# Patient Record
Sex: MALE | Race: WHITE | Employment: OTHER | URBAN - METROPOLITAN AREA
[De-identification: names, ages, dates, MRNs, and addresses within clinical notes are randomized per-mention and may not be internally consistent; named-entity substitution may affect disease eponyms.]

---

## 2017-01-03 ENCOUNTER — LAB ENCOUNTER (OUTPATIENT)
Dept: LAB | Facility: HOSPITAL | Age: 37
End: 2017-01-03
Attending: SPECIALIST
Payer: MEDICAID

## 2017-01-03 ENCOUNTER — HOSPITAL ENCOUNTER (OUTPATIENT)
Dept: ULTRASOUND IMAGING | Facility: HOSPITAL | Age: 37
Discharge: HOME | End: 2017-01-03
Attending: SPECIALIST
Payer: MEDICAID

## 2017-01-03 ENCOUNTER — HOSPITAL ENCOUNTER (OUTPATIENT)
Dept: CT IMAGING | Facility: HOSPITAL | Age: 37
Discharge: HOME | End: 2017-01-03
Attending: SPECIALIST
Payer: MEDICAID

## 2017-01-03 ENCOUNTER — HOSPITAL ENCOUNTER (OUTPATIENT)
Dept: GENERAL RADIOLOGY | Facility: HOSPITAL | Age: 37
Discharge: HOME | End: 2017-01-03
Attending: RADIOLOGY
Payer: MEDICAID

## 2017-01-03 VITALS
HEIGHT: 73 IN | WEIGHT: 315 LBS | HEART RATE: 81 BPM | BODY MASS INDEX: 41.75 KG/M2 | OXYGEN SATURATION: 97 % | RESPIRATION RATE: 18 BRPM | TEMPERATURE: 98 F | SYSTOLIC BLOOD PRESSURE: 132 MMHG | DIASTOLIC BLOOD PRESSURE: 88 MMHG

## 2017-01-03 DIAGNOSIS — C78.02 SECONDARY SARCOMA OF LEFT LUNG (HCC): ICD-10-CM

## 2017-01-03 DIAGNOSIS — C78.01 SECONDARY SARCOMA OF RIGHT LUNG (HCC): ICD-10-CM

## 2017-01-03 DIAGNOSIS — J90 PLEURAL EFFUSION, RIGHT: ICD-10-CM

## 2017-01-03 DIAGNOSIS — C49.21 SOFT TISSUE SARCOMA OF RIGHT THIGH (HCC): ICD-10-CM

## 2017-01-03 DIAGNOSIS — C49.21 SOFT TISSUE SARCOMA OF RIGHT THIGH (HCC): Primary | ICD-10-CM

## 2017-01-03 LAB
ERYTHROCYTE [DISTWIDTH] IN BLOOD BY AUTOMATED COUNT: 13.7 % (ref 11.5–16)
GLUCOSE BLD-MCNC: 101 MG/DL (ref 65–99)
GLUCOSE BLD-MCNC: 112 MG/DL (ref 65–99)
GLUCOSE PLEURAL FLUID: 119 MG/DL
HCT VFR BLD AUTO: 43.4 % (ref 37–53)
HGB BLD-MCNC: 14.3 G/DL (ref 13–17)
INR BLD: 0.91 (ref 0.89–1.12)
LDH PLEURAL FLUID: 289 U/L
MCH RBC QN AUTO: 28.9 PG (ref 27–33.2)
MCHC RBC AUTO-ENTMCNC: 32.9 G/DL (ref 31–37)
MCV RBC AUTO: 87.9 FL (ref 80–99)
PLATELET # BLD AUTO: 213 10(3)UL (ref 150–450)
PLEURAL FLUID PH: 7.46 (ref 7.2–7.5)
PSA SERPL DL<=0.01 NG/ML-MCNC: 12.5 SECONDS (ref 12.3–14.8)
RBC # BLD AUTO: 4.94 X10(6)UL (ref 4.3–5.7)
RED CELL DISTRIBUTION WIDTH-SD: 44.4 FL (ref 35.1–46.3)
TOTAL PROTEIN PLEURAL FLUID: 4.4 G/DL
WBC # BLD AUTO: 8.3 X10(3) UL (ref 4–13)

## 2017-01-03 PROCEDURE — 85027 COMPLETE CBC AUTOMATED: CPT | Performed by: RADIOLOGY

## 2017-01-03 PROCEDURE — 83615 LACTATE (LD) (LDH) ENZYME: CPT | Performed by: SPECIALIST

## 2017-01-03 PROCEDURE — 71250 CT THORAX DX C-: CPT

## 2017-01-03 PROCEDURE — 88108 CYTOPATH CONCENTRATE TECH: CPT | Performed by: SPECIALIST

## 2017-01-03 PROCEDURE — 85610 PROTHROMBIN TIME: CPT | Performed by: RADIOLOGY

## 2017-01-03 PROCEDURE — 82945 GLUCOSE OTHER FLUID: CPT | Performed by: SPECIALIST

## 2017-01-03 PROCEDURE — 32555 ASPIRATE PLEURA W/ IMAGING: CPT | Performed by: SPECIALIST

## 2017-01-03 PROCEDURE — 87205 SMEAR GRAM STAIN: CPT | Performed by: SPECIALIST

## 2017-01-03 PROCEDURE — 87070 CULTURE OTHR SPECIMN AEROBIC: CPT | Performed by: SPECIALIST

## 2017-01-03 PROCEDURE — 82962 GLUCOSE BLOOD TEST: CPT

## 2017-01-03 PROCEDURE — 84157 ASSAY OF PROTEIN OTHER: CPT | Performed by: SPECIALIST

## 2017-01-03 PROCEDURE — 82800 BLOOD PH: CPT | Performed by: SPECIALIST

## 2017-01-03 PROCEDURE — 88305 TISSUE EXAM BY PATHOLOGIST: CPT | Performed by: SPECIALIST

## 2017-01-03 PROCEDURE — 71010 XR CHEST AP PORTABLE  (CPT=71010): CPT

## 2017-01-03 NOTE — IMAGING NOTE
US guided thoracentesis done, pt prepped and procedure done, pt tolerated well. 300 ml of fluid obtained.   Pt report to same day, pt transported to room 58

## 2017-01-03 NOTE — PROCEDURES
[facility]  Procedure Note    Shruthi Shrestha II Patient Status:  Outpatient    1980 MRN [id number]   Location [address] [place] [address] Attending Chrissy Milligan MD   Hosp Day # 0 PCP None Pcp     Procedure: Right thoracentesis    Pre-

## 2017-01-05 LAB — NON GYNE INTERPRETATION: NEGATIVE

## 2017-01-06 ENCOUNTER — TELEPHONE (OUTPATIENT)
Dept: HEMATOLOGY/ONCOLOGY | Facility: HOSPITAL | Age: 37
End: 2017-01-06

## 2017-01-06 NOTE — TELEPHONE ENCOUNTER
MD Any Dennis, RN                     Let patient know that fluid from pleural effusion is benign in nature and reflects a post-operative accumulation. Good result. I'll see him at his scheduled appt in March.

## 2017-01-06 NOTE — TELEPHONE ENCOUNTER
Return call from patient - notified of test results per Denzel Sirchristopher. Patient stated understanding. Will follow up in March as directed.

## 2017-01-07 PROBLEM — J90 PLEURAL EFFUSION, RIGHT: Status: ACTIVE | Noted: 2017-01-07

## 2017-03-13 ENCOUNTER — OFFICE VISIT (OUTPATIENT)
Dept: HEMATOLOGY/ONCOLOGY | Facility: HOSPITAL | Age: 37
End: 2017-03-13
Attending: SPECIALIST
Payer: MEDICAID

## 2017-03-13 ENCOUNTER — HOSPITAL ENCOUNTER (OUTPATIENT)
Dept: CT IMAGING | Age: 37
Discharge: HOME | End: 2017-03-13
Attending: SPECIALIST
Payer: MEDICAID

## 2017-03-13 VITALS
HEART RATE: 102 BPM | WEIGHT: 315 LBS | SYSTOLIC BLOOD PRESSURE: 150 MMHG | BODY MASS INDEX: 41.75 KG/M2 | DIASTOLIC BLOOD PRESSURE: 93 MMHG | OXYGEN SATURATION: 94 % | RESPIRATION RATE: 20 BRPM | TEMPERATURE: 99 F | HEIGHT: 73 IN

## 2017-03-13 DIAGNOSIS — C49.9 SOFT TISSUE SARCOMA (HCC): ICD-10-CM

## 2017-03-13 DIAGNOSIS — C78.01 SECONDARY SARCOMA OF RIGHT LUNG (HCC): ICD-10-CM

## 2017-03-13 DIAGNOSIS — C78.02 SECONDARY SARCOMA OF LEFT LUNG (HCC): ICD-10-CM

## 2017-03-13 DIAGNOSIS — C49.21 SOFT TISSUE SARCOMA OF RIGHT THIGH (HCC): Primary | ICD-10-CM

## 2017-03-13 PROCEDURE — 71250 CT THORAX DX C-: CPT

## 2017-03-13 PROCEDURE — 99213 OFFICE O/P EST LOW 20 MIN: CPT | Performed by: SPECIALIST

## 2017-03-13 RX ORDER — GABAPENTIN 300 MG/1
300 CAPSULE ORAL 3 TIMES DAILY
COMMUNITY
End: 2018-01-19

## 2017-03-23 NOTE — PROGRESS NOTES
[facility] Progress Note      Patient Name: Sebastián eJnkins   YOB: 1980  Medical Record Number: [id number]  Attending Physician: Ra Farnsworth. Blane Maria M.D.      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 Pleural effusion was felt to be a post-operative effusion. History of Present Illness  Patient returns for scheduled follow up. He denies any new or progressive cough or shortness of breath.  Denies any fe BMI 47.93 kg/m2  SpO2 94%    Physical Examination   Constitutional      Well developed, well nourished. Appears close to chronological age. No apparent distress. Head                   Normocephalic and atraumatic.   Eyes                  Conjunctiva alisha AORTA:  Normal.MEDIASTINUM/JAIRO:  Scattered non-enlarged middle mediastinal lymph nodes are redemonstrated. No new or enlarging nodes are identified. CARDIAC:  Normal.PLEURA:  Previously noted 3.9 cm thick dependent right pleural effusion has markedly impro pleomorphic soft tissue sarcoma: There is no definitive evidence of metastatic disease on CT scan. In the absence of measurable disease, no systemic therapy is recommended.  Patient remains at high risk of recurrent metastasis and continued active surveilla

## 2017-06-12 ENCOUNTER — APPOINTMENT (OUTPATIENT)
Dept: HEMATOLOGY/ONCOLOGY | Facility: HOSPITAL | Age: 37
End: 2017-06-12
Attending: SPECIALIST
Payer: MEDICAID

## 2017-06-16 ENCOUNTER — OFFICE VISIT (OUTPATIENT)
Dept: HEMATOLOGY/ONCOLOGY | Facility: HOSPITAL | Age: 37
End: 2017-06-16
Attending: SPECIALIST
Payer: MEDICAID

## 2017-06-16 ENCOUNTER — HOSPITAL ENCOUNTER (OUTPATIENT)
Dept: CT IMAGING | Age: 37
Discharge: HOME | End: 2017-06-16
Attending: SPECIALIST
Payer: MEDICAID

## 2017-06-16 VITALS
TEMPERATURE: 98 F | HEART RATE: 109 BPM | OXYGEN SATURATION: 97 % | BODY MASS INDEX: 41.75 KG/M2 | HEIGHT: 72.99 IN | DIASTOLIC BLOOD PRESSURE: 87 MMHG | RESPIRATION RATE: 20 BRPM | WEIGHT: 315 LBS | SYSTOLIC BLOOD PRESSURE: 145 MMHG

## 2017-06-16 DIAGNOSIS — C78.01 SECONDARY SARCOMA OF RIGHT LUNG (HCC): ICD-10-CM

## 2017-06-16 DIAGNOSIS — C49.21 SOFT TISSUE SARCOMA OF RIGHT THIGH (HCC): ICD-10-CM

## 2017-06-16 DIAGNOSIS — C78.02 SECONDARY SARCOMA OF LEFT LUNG (HCC): ICD-10-CM

## 2017-06-16 DIAGNOSIS — C49.21 SOFT TISSUE SARCOMA OF RIGHT THIGH (HCC): Primary | ICD-10-CM

## 2017-06-16 PROCEDURE — 71250 CT THORAX DX C-: CPT | Performed by: SPECIALIST

## 2017-06-16 PROCEDURE — 99213 OFFICE O/P EST LOW 20 MIN: CPT | Performed by: SPECIALIST

## 2017-06-16 NOTE — PROGRESS NOTES
Patient is here today for follow up with Janice Flannery for Sarcoma. Patient stated pain low back and right leg is rated a 4 on a scale of 0-10. Right rib area surgical site pain is improved neuropathy improved. Going to Physical therapy for back and leg pain.

## 2017-06-16 NOTE — PROGRESS NOTES
[facility] Progress Note      Patient Name: Linda Yusuf   YOB: 1980  Medical Record Number: [id number]  Attending Physician: Sara Kang. Chari Leach M.D.      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 Pleural effusion was felt to be a post-operative effusion. History of Present Illness  Patient returns for scheduled follow up. He denies any new or progressive cough or shortness of breath.  Denies any fe 1.854 m (6' 0.99\")   Wt (!) 158.3 kg (349 lb)   SpO2 97%   BMI 46.06 kg/m²     Physical Examination   Constitutional      Well developed, well nourished. Appears close to chronological age. No apparent distress.    Head                   Normocephalic and middle lobes. #2. No evidence of recurrent or metastatic disease.                        I independently visualized the radiologic images in addition to reviewing the written report: Post surgical changes; no pleural effusion; no new parenchymal lesions sugg

## 2017-09-22 ENCOUNTER — OFFICE VISIT (OUTPATIENT)
Dept: HEMATOLOGY/ONCOLOGY | Facility: HOSPITAL | Age: 37
End: 2017-09-22
Attending: SPECIALIST
Payer: MEDICAID

## 2017-09-22 ENCOUNTER — HOSPITAL ENCOUNTER (OUTPATIENT)
Dept: CT IMAGING | Age: 37
Discharge: HOME | End: 2017-09-22
Attending: SPECIALIST
Payer: MEDICAID

## 2017-09-22 VITALS
WEIGHT: 315 LBS | RESPIRATION RATE: 20 BRPM | OXYGEN SATURATION: 97 % | TEMPERATURE: 98 F | DIASTOLIC BLOOD PRESSURE: 89 MMHG | HEIGHT: 72.99 IN | SYSTOLIC BLOOD PRESSURE: 140 MMHG | BODY MASS INDEX: 41.75 KG/M2 | HEART RATE: 111 BPM

## 2017-09-22 DIAGNOSIS — C78.02 SECONDARY SARCOMA OF LEFT LUNG (HCC): ICD-10-CM

## 2017-09-22 DIAGNOSIS — C78.01 SECONDARY SARCOMA OF RIGHT LUNG (HCC): ICD-10-CM

## 2017-09-22 DIAGNOSIS — C49.21 SOFT TISSUE SARCOMA OF RIGHT THIGH (HCC): ICD-10-CM

## 2017-09-22 DIAGNOSIS — C49.21 SOFT TISSUE SARCOMA OF RIGHT THIGH (HCC): Primary | ICD-10-CM

## 2017-09-22 PROCEDURE — 99213 OFFICE O/P EST LOW 20 MIN: CPT | Performed by: SPECIALIST

## 2017-09-22 PROCEDURE — 71250 CT THORAX DX C-: CPT | Performed by: SPECIALIST

## 2017-09-26 NOTE — PROGRESS NOTES
[facility] Progress Note      Patient Name: Thomas Hylton   YOB: 1980  Medical Record Number: [id number]  Attending Physician: Ana Malone M.D.      Date of Visit: 9/22/2017      Chief Complaint  Metastatic pleomorphic soft ti performed on 01/03/2017; cytology was negative. Pleural effusion was felt to be a post-operative effusion. History of Present Illness  Patient returns for scheduled follow up. He denies any new or progressive cough or shortness of breath.  Denies any fe Pulse 111   Temp 98.4 °F (36.9 °C)   Resp 20   Ht 1.854 m (6' 0.99\")   Wt (!) 155.4 kg (342 lb 8 oz)   SpO2 97%   BMI 45.20 kg/m²     Physical Examination   Constitutional      Well developed, well nourished. Appears close to chronological age.  No apparen written report: Post surgical changes; no pleural effusion; no new parenchymal lesions suggestive of metastatic disease. Impression and Plan   1. Metastatic pleomorphic soft tissue sarcoma:  There is no definitive evidence of metastatic disease on CT

## 2017-12-05 ENCOUNTER — SOCIAL WORK SERVICES (OUTPATIENT)
Dept: HEMATOLOGY/ONCOLOGY | Facility: HOSPITAL | Age: 37
End: 2017-12-05

## 2017-12-05 NOTE — PROGRESS NOTES
LAURA faxed patients requested medical record to [address] [facility], Disability Determination at fax # [phone] as requested by patient.

## 2018-01-19 ENCOUNTER — HOSPITAL ENCOUNTER (OUTPATIENT)
Dept: CT IMAGING | Age: 38
Discharge: HOME | End: 2018-01-19
Attending: SPECIALIST
Payer: MEDICAID

## 2018-01-19 ENCOUNTER — OFFICE VISIT (OUTPATIENT)
Dept: HEMATOLOGY/ONCOLOGY | Facility: HOSPITAL | Age: 38
End: 2018-01-19
Attending: SPECIALIST
Payer: MEDICAID

## 2018-01-19 VITALS
SYSTOLIC BLOOD PRESSURE: 134 MMHG | OXYGEN SATURATION: 97 % | DIASTOLIC BLOOD PRESSURE: 90 MMHG | BODY MASS INDEX: 41.75 KG/M2 | RESPIRATION RATE: 20 BRPM | WEIGHT: 315 LBS | HEART RATE: 105 BPM | TEMPERATURE: 97 F | HEIGHT: 72.99 IN

## 2018-01-19 DIAGNOSIS — C78.01 SECONDARY SARCOMA OF RIGHT LUNG (HCC): ICD-10-CM

## 2018-01-19 DIAGNOSIS — C78.02 SECONDARY SARCOMA OF LEFT LUNG (HCC): ICD-10-CM

## 2018-01-19 DIAGNOSIS — C49.21 SOFT TISSUE SARCOMA OF RIGHT THIGH (HCC): ICD-10-CM

## 2018-01-19 DIAGNOSIS — C49.21 SOFT TISSUE SARCOMA OF RIGHT THIGH (HCC): Primary | ICD-10-CM

## 2018-01-19 PROCEDURE — 71250 CT THORAX DX C-: CPT | Performed by: SPECIALIST

## 2018-01-19 PROCEDURE — 99213 OFFICE O/P EST LOW 20 MIN: CPT | Performed by: SPECIALIST

## 2018-01-19 NOTE — PROGRESS NOTES
Patient is here today for follow up with Jonah Roland for Sarcoma. Patient denies pain. Stated he is doing good. Medication list and medical history were reviewed and updated.      Education Record    Learner:  Patient    Disease / Diagnosis: Sarcoma R thigh

## 2018-01-29 NOTE — PROGRESS NOTES
[facility] Progress Note      Patient Name: Jose Luis Sinclair   YOB: 1980  Medical Record Number: [id number]  Attending Physician: Zachary Conley. Saji Ledbetter M.D.      Date of Visit: 1/19/2018      Chief Complaint  Metastatic pleomorphic soft ti performed on 01/03/2017; cytology was negative. Pleural effusion was felt to be a post-operative effusion. History of Present Illness  Patient returns for scheduled follow up. He denies any new or progressive cough or shortness of breath.  Denies any fe Normocephalic and atraumatic. Eyes                  Conjunctiva clear; sclera anicteric. ENMT                 External nose normal; external ears normal.  Neck                   Supple, without masses.   Hematologic/Lymphatic No cervical, supracl remains at high risk of recurrent metastasis and continued active surveillance is advised, though patient's history suggests a relatively indolent growth pattern.  Originally, I had planned to increase the interval between imaging for this year to every 4 m

## 2018-04-20 ENCOUNTER — APPOINTMENT (OUTPATIENT)
Dept: HEMATOLOGY/ONCOLOGY | Facility: HOSPITAL | Age: 38
End: 2018-04-20
Attending: SPECIALIST
Payer: COMMERCIAL

## 2018-10-26 ENCOUNTER — TELEPHONE (OUTPATIENT)
Dept: HEMATOLOGY/ONCOLOGY | Facility: HOSPITAL | Age: 38
End: 2018-10-26

## 2018-10-27 NOTE — PROGRESS NOTES
[facility] Progress Note      Patient Name: Benton Rogers   YOB: 1980  Medical Record Number: [id number]  Attending Physician: Darleen James. Melissa Rivas M.D.      Date of Visit: 11/2/2018      Chief Complaint  Metastatic pleomorphic soft ti performed on 01/03/2017; cytology was negative. Pleural effusion was felt to be a post-operative effusion. History of Present Illness  Patient returns for follow up after last being seen in 01/2018.  He was unable to follow up until now due to insurance nourished. Appears close to chronological age. No apparent distress. Head                   Normocephalic and atraumatic. Eyes                  Conjunctiva clear; sclera anicteric.   ENMT                 External nose normal; external ears normal.  Neck images of the upper abdomen are unremarkable. BONES:  No bony lesion or fracture. CONCLUSION:  No significant change in the appearance of the chest. No new pulmonary nodules are seen. No mediastinal lymphadenopathy. Continued followup is suggested.

## 2018-11-02 ENCOUNTER — HOSPITAL ENCOUNTER (OUTPATIENT)
Dept: CT IMAGING | Age: 38
Discharge: HOME | End: 2018-11-02
Attending: SPECIALIST
Payer: MEDICARE

## 2018-11-02 ENCOUNTER — OFFICE VISIT (OUTPATIENT)
Dept: HEMATOLOGY/ONCOLOGY | Facility: HOSPITAL | Age: 38
End: 2018-11-02
Attending: SPECIALIST
Payer: MEDICARE

## 2018-11-02 VITALS
BODY MASS INDEX: 41.75 KG/M2 | HEIGHT: 72.99 IN | RESPIRATION RATE: 16 BRPM | DIASTOLIC BLOOD PRESSURE: 90 MMHG | SYSTOLIC BLOOD PRESSURE: 152 MMHG | HEART RATE: 110 BPM | OXYGEN SATURATION: 97 % | WEIGHT: 315 LBS | TEMPERATURE: 98 F

## 2018-11-02 DIAGNOSIS — C78.01 SECONDARY SARCOMA OF RIGHT LUNG (HCC): ICD-10-CM

## 2018-11-02 DIAGNOSIS — C49.21 SOFT TISSUE SARCOMA OF RIGHT THIGH (HCC): ICD-10-CM

## 2018-11-02 DIAGNOSIS — C78.02 SECONDARY SARCOMA OF LEFT LUNG (HCC): ICD-10-CM

## 2018-11-02 DIAGNOSIS — I10 ESSENTIAL HYPERTENSION: ICD-10-CM

## 2018-11-02 DIAGNOSIS — C49.21 SOFT TISSUE SARCOMA OF RIGHT THIGH (HCC): Primary | ICD-10-CM

## 2018-11-02 PROCEDURE — 71250 CT THORAX DX C-: CPT | Performed by: SPECIALIST

## 2018-11-02 PROCEDURE — 99214 OFFICE O/P EST MOD 30 MIN: CPT | Performed by: SPECIALIST

## 2019-05-17 ENCOUNTER — HOSPITAL ENCOUNTER (OUTPATIENT)
Dept: CT IMAGING | Age: 39
Discharge: HOME | End: 2019-05-17
Attending: SPECIALIST
Payer: MEDICARE

## 2019-05-17 DIAGNOSIS — C78.02 SECONDARY SARCOMA OF LEFT LUNG (HCC): ICD-10-CM

## 2019-05-17 DIAGNOSIS — C49.21 SOFT TISSUE SARCOMA OF RIGHT THIGH (HCC): ICD-10-CM

## 2019-05-17 PROCEDURE — 71250 CT THORAX DX C-: CPT | Performed by: SPECIALIST

## 2019-05-22 ENCOUNTER — APPOINTMENT (OUTPATIENT)
Dept: HEMATOLOGY/ONCOLOGY | Age: 39
End: 2019-05-22
Attending: SPECIALIST
Payer: MEDICARE

## 2019-06-05 ENCOUNTER — TELEPHONE (OUTPATIENT)
Dept: HEMATOLOGY/ONCOLOGY | Facility: HOSPITAL | Age: 39
End: 2019-06-05

## 2019-06-05 NOTE — TELEPHONE ENCOUNTER
Per Dr. Ziyad Fontenot results released to My Chart. Patient aware. Reinforced patient to follow up in 6 months. Patient stated he is living in [place]. Does not have a new physician, plans to follow up with Dr. Ziyad Fontenot.   Patient instructed would be due in Nov

## 2019-11-20 ENCOUNTER — TELEPHONE (OUTPATIENT)
Dept: HEMATOLOGY/ONCOLOGY | Facility: HOSPITAL | Age: 39
End: 2019-11-20

## 2019-11-21 ENCOUNTER — TELEPHONE (OUTPATIENT)
Dept: HEMATOLOGY/ONCOLOGY | Facility: HOSPITAL | Age: 39
End: 2019-11-21

## 2019-11-22 DIAGNOSIS — C49.21 SOFT TISSUE SARCOMA OF RIGHT THIGH (HCC): Primary | ICD-10-CM

## 2019-11-22 DIAGNOSIS — C78.01 SECONDARY SARCOMA OF RIGHT LUNG (HCC): ICD-10-CM

## 2019-11-22 NOTE — TELEPHONE ENCOUNTER
MD Stephani Ho, RN   Caller: Unspecified Jody Bidding,  4:11 PM)             Order for CT chest is in place. Left message on patient voicemail. Notified. CT orders in place.

## 2019-11-23 NOTE — PROGRESS NOTES
[facility] Progress Note      Patient Name: Pricila Teixeira   YOB: 1980  Medical Record Number: [id number]  Attending Physician: Nate Severino. Cyndi Valente M.D.      Date of Visit: 11/25/2019      Chief Complaint  Metastatic pleomorphic soft performed on 01/03/2017; cytology was negative. Pleural effusion was felt to be a post-operative effusion. History of Present Illness  Patient returns for follow up. He denies any new or progressive cough or shortness of breath.  Denies any fevers, chil chronological age. No apparent distress. Head                   Normocephalic and atraumatic. Eyes                  Conjunctiva clear; sclera anicteric.   ENMT                 External nose normal; external ears normal.  Neck                   Supple, wi stable. CARDIAC:  No enlargement, pericardial thickening, or significant calcification. PLEURA:  No mass or effusion. THORACIC AORTA:  Unremarkable as seen on non-contrast imaging. CHEST WALL:  No mass or axillary adenopathy.  LIMITED ABDOMEN:  There is decr months. Risk Level: High - metastatic soft tissue sarcoma. Electronically signed by:    Cornell Chun M.D.   [facility]  Director, Bone and Soft Tissue Sarcoma Program  Section of Hematology/Oncology, [facility]

## 2019-11-25 ENCOUNTER — OFFICE VISIT (OUTPATIENT)
Dept: HEMATOLOGY/ONCOLOGY | Facility: HOSPITAL | Age: 39
End: 2019-11-25
Attending: SPECIALIST
Payer: MEDICARE

## 2019-11-25 ENCOUNTER — HOSPITAL ENCOUNTER (OUTPATIENT)
Dept: CT IMAGING | Age: 39
Discharge: HOME | End: 2019-11-25
Attending: SPECIALIST
Payer: MEDICARE

## 2019-11-25 VITALS
HEIGHT: 72.99 IN | SYSTOLIC BLOOD PRESSURE: 147 MMHG | HEART RATE: 104 BPM | TEMPERATURE: 97 F | BODY MASS INDEX: 41.75 KG/M2 | WEIGHT: 315 LBS | RESPIRATION RATE: 18 BRPM | DIASTOLIC BLOOD PRESSURE: 98 MMHG | OXYGEN SATURATION: 97 %

## 2019-11-25 DIAGNOSIS — C78.02 SECONDARY SARCOMA OF LEFT LUNG (HCC): ICD-10-CM

## 2019-11-25 DIAGNOSIS — C49.21 SOFT TISSUE SARCOMA OF RIGHT THIGH (HCC): ICD-10-CM

## 2019-11-25 DIAGNOSIS — K75.81 STEATOHEPATITIS: ICD-10-CM

## 2019-11-25 DIAGNOSIS — C78.01 SECONDARY SARCOMA OF RIGHT LUNG (HCC): ICD-10-CM

## 2019-11-25 DIAGNOSIS — R16.1 SPLENOMEGALY: ICD-10-CM

## 2019-11-25 DIAGNOSIS — R16.0 HEPATOMEGALY: Primary | ICD-10-CM

## 2019-11-25 PROCEDURE — 71250 CT THORAX DX C-: CPT | Performed by: SPECIALIST

## 2019-11-25 PROCEDURE — 99215 OFFICE O/P EST HI 40 MIN: CPT | Performed by: SPECIALIST

## 2019-11-25 NOTE — PROGRESS NOTES
Patient is here today for follow up with Mirian Sacks for Seminoma . Patient denies pain. Stated he is feeling good. Medication list and medical history were reviewed and updated.      Education Record    Learner:  Patient    Disease / Diagnosis: Seminoma

## 2019-11-27 ENCOUNTER — TELEPHONE (OUTPATIENT)
Dept: HEMATOLOGY/ONCOLOGY | Facility: HOSPITAL | Age: 39
End: 2019-11-27

## 2019-11-27 ENCOUNTER — HOSPITAL ENCOUNTER (OUTPATIENT)
Dept: CT IMAGING | Age: 39
Discharge: HOME | End: 2019-11-27
Attending: SPECIALIST
Payer: MEDICARE

## 2019-11-27 DIAGNOSIS — R16.0 HEPATOMEGALY: ICD-10-CM

## 2019-11-27 DIAGNOSIS — R16.1 SPLENOMEGALY: ICD-10-CM

## 2019-11-27 PROCEDURE — 82565 ASSAY OF CREATININE: CPT

## 2019-11-27 PROCEDURE — 74177 CT ABD & PELVIS W/CONTRAST: CPT | Performed by: SPECIALIST

## 2019-11-27 NOTE — TELEPHONE ENCOUNTER
MD Toby Rolon RN             Let him know that his CT scan shows diffuse fatty liver. Spleen is slightly enlarged and that is due to the fatty liver. No sarcoma. The cause of the fatty liver is probably diabetes.  He should follo

## 2020-07-27 ENCOUNTER — TELEPHONE (OUTPATIENT)
Dept: HEMATOLOGY/ONCOLOGY | Facility: HOSPITAL | Age: 40
End: 2020-07-27

## 2020-07-27 NOTE — TELEPHONE ENCOUNTER
Nicky Andrews calling he is going to have a CT done today in [place] ([facility]) Dr Dequan Ordoñez. Nicky Andrews wants to make sure Dr Yunier Roger gets the results possibly in care everywhere.  Thank you Justin Thomas

## (undated) NOTE — MR AVS SNAPSHOT
After Visit Summary   6/16/2017    Eusebio Freddie DARRELL    MRN: [id number]           Diagnoses this Visit     Soft tissue sarcoma of right thigh [facility])    -  Primary     Secondary sarcoma of right lung ([facility] [address]         [facility]

## (undated) NOTE — MR AVS SNAPSHOT
After Visit Summary   3/13/2017    [facility]    MRN: [id number]           Diagnoses this Visit     Soft tissue sarcoma of right thigh [facility])    -  Primary     Secondary sarcoma of right lung ([facility]         Secondary sarcoma of left lung ([facility] office, you can view your past visit information in [employer] by going to Visits < Visit Summaries. GSOUND questions? Call [phone] for help. [employer] is NOT to be used for urgent needs. For medical emergencies, dial 911.

## (undated) NOTE — IP AVS SNAPSHOT
[facility] [place] [address] [place], [address] ~ [phone]                Discharge Summary   1/3/2017    Bhavya Mullins II           Admission Information        Provider Department    1/3/2017 MD Anupama Valle · There is any change in color or temperature of the area where the biopsy was performed  · You develop increasing pain in shortness of breath (call 911 or go to the nearest ER)    Instructions and Information about Your Health     None      Follow-up Info Provider Department Dept Phone    1/3/2017  8:00 AM [facility] [phone]         We want to hear from you       We want to hear from you, please share your experience with us by returning the survey you will receive in the mail.   Than